# Patient Record
(demographics unavailable — no encounter records)

---

## 2024-10-17 NOTE — HISTORY OF PRESENT ILLNESS
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 It has been 4 weeks since his last Xolair injection. Al reports that he has not had any hives since his last injection. Presently, he is feeling well. No adverse reactions reported.

## 2024-10-17 NOTE — PHYSICAL EXAM
[Alert] : alert [Healthy Appearance] : healthy appearance [No Acute Distress] : no acute distress [Normal Pupil & Iris Size/Symmetry] : normal pupil and iris size and symmetry [No Discharge] : no discharge [Sclera Not Icteric] : sclera not icteric [Normal TMs] : both tympanic membranes were normal [Normal Nasal Mucosa] : the nasal mucosa was normal [Normal Lips/Tongue] : the lips and tongue were normal [Normal Outer Ear/Nose] : the ears and nose were normal in appearance [No Nasal Discharge] : no nasal discharge [No Thrush] : no thrush [Normal Rate and Effort] : normal respiratory rhythm and effort [No Crackles] : no crackles [No Retractions] : no retractions [Bilateral Audible Breath Sounds] : bilateral audible breath sounds [Normal Rate] : heart rate was normal  [Normal S1, S2] : normal S1 and S2 [No murmur] : no murmur [Regular Rhythm] : with a regular rhythm [Skin Intact] : skin intact  [No Rash] : no rash [No Skin Lesions] : no skin lesions [No clubbing] : no clubbing [No Cyanosis] : no cyanosis [Normal Mood] : mood was normal [Normal Affect] : affect was normal [Judgment and Insight Age Appropriate] : judgement and insight is age appropriate [Urticaria] : no urticaria

## 2024-11-14 NOTE — HISTORY OF PRESENT ILLNESS
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 He has been very consistent with Xolair Q4 week dosing. Al reports that he has not had any hives for the past few months. No adverse events reported. Presently, he is feeling well.

## 2024-12-12 NOTE — ASSESSMENT
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

## 2024-12-12 NOTE — HISTORY OF PRESENT ILLNESS
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 He has not had any hives or swelling for the past few months. Al is very consistent with receiving Xolair Q4 weeks. No adverse events reported. Currently, he is feeling well.

## 2025-01-14 NOTE — ASSESSMENT
[FreeTextEntry1] : Chronic Spontaneous Urticaria is in good control Continue Xolair 300mg Q4 weeks  Xolair 300mg administered in office

## 2025-01-14 NOTE — HISTORY OF PRESENT ILLNESS
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 It has been 4 weeks since his last Xolair injection. Al has been very consistent with receiving Xolair Q4 weeks. He reports that he has not had any hives and feels the Xolair is keeping his urticaria under good control. Presently, he is feeling well

## 2025-02-21 NOTE — ASSESSMENT
[FreeTextEntry1] : Chronic Spontaneous Urticaria is in good control Continue Xolair 300mg Q4 weeks Al is learning how to self-administer the medication  Dermatitis of ear canals Fluocinolone 3gtts to both ears BID for 5 days  Xolair 300mg administered in office

## 2025-02-21 NOTE — HISTORY OF PRESENT ILLNESS
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 He has been very consistent with Xolair Q4 week dosing. Al reports that he has not had any hives for the past few months. No adverse events reported. Presently, he is feeling well.  Al reports dried crusty skin in both ears which has been very itchy.

## 2025-02-21 NOTE — PHYSICAL EXAM
[Alert] : alert [Healthy Appearance] : healthy appearance [No Acute Distress] : no acute distress [Normal Pupil & Iris Size/Symmetry] : normal pupil and iris size and symmetry [No Discharge] : no discharge [Sclera Not Icteric] : sclera not icteric [Normal TMs] : both tympanic membranes were normal [Normal Nasal Mucosa] : the nasal mucosa was normal [Normal Lips/Tongue] : the lips and tongue were normal [Normal Outer Ear/Nose] : the ears and nose were normal in appearance [No Nasal Discharge] : no nasal discharge [No Thrush] : no thrush [Normal Rate and Effort] : normal respiratory rhythm and effort [No Crackles] : no crackles [No Retractions] : no retractions [Bilateral Audible Breath Sounds] : bilateral audible breath sounds [Normal Rate] : heart rate was normal  [Normal S1, S2] : normal S1 and S2 [No murmur] : no murmur [Regular Rhythm] : with a regular rhythm [Skin Intact] : skin intact  [No Rash] : no rash [No Skin Lesions] : no skin lesions [No clubbing] : no clubbing [No Cyanosis] : no cyanosis [Normal Mood] : mood was normal [Normal Affect] : affect was normal [Judgment and Insight Age Appropriate] : judgement and insight is age appropriate [Urticaria] : no urticaria [de-identified] : Ear canals- dry, scaley erythematous. No pain

## 2025-03-19 NOTE — ASSESSMENT
[FreeTextEntry1] : Chronic Spontaneous Urticaria is in good control Continue Xolair 300mg Q4 weeks Xolair 300mg administered in office Al will continue Xolair 300mg Q4 weeks at home. He is able to properly administer the medication.

## 2025-03-19 NOTE — HISTORY OF PRESENT ILLNESS
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 He has not had any hives or swelling for the past few months. Al is very consistent with receiving Xolair Q4 weeks. No adverse events reported. Currently, he is feeling well. He will be continuing Xolair at home Q4 weeks. Al is able to properly administer the medication.